# Patient Record
Sex: FEMALE | Race: WHITE | NOT HISPANIC OR LATINO | Employment: FULL TIME | URBAN - METROPOLITAN AREA
[De-identification: names, ages, dates, MRNs, and addresses within clinical notes are randomized per-mention and may not be internally consistent; named-entity substitution may affect disease eponyms.]

---

## 2019-02-14 ENCOUNTER — APPOINTMENT (EMERGENCY)
Dept: RADIOLOGY | Facility: HOSPITAL | Age: 19
End: 2019-02-14

## 2019-02-14 ENCOUNTER — HOSPITAL ENCOUNTER (EMERGENCY)
Facility: HOSPITAL | Age: 19
Discharge: HOME | End: 2019-02-14
Attending: EMERGENCY MEDICINE | Admitting: EMERGENCY MEDICINE

## 2019-02-14 VITALS
HEART RATE: 113 BPM | TEMPERATURE: 98.7 F | RESPIRATION RATE: 20 BRPM | HEIGHT: 67 IN | OXYGEN SATURATION: 99 % | BODY MASS INDEX: 29.55 KG/M2 | WEIGHT: 188.27 LBS | SYSTOLIC BLOOD PRESSURE: 131 MMHG | DIASTOLIC BLOOD PRESSURE: 75 MMHG

## 2019-02-14 DIAGNOSIS — J45.901 ASTHMA EXACERBATION: Primary | ICD-10-CM

## 2019-02-14 DIAGNOSIS — J06.9 VIRAL URI: ICD-10-CM

## 2019-02-14 LAB
ANION GAP SERPL CALCULATED.3IONS-SCNC: 14 MMOL/L (ref 4–13)
B-HCG SERPL-ACNC: <2 MIU/ML
BASOPHILS # BLD AUTO: 0.05 THOUSANDS/ΜL (ref 0–0.1)
BASOPHILS NFR BLD AUTO: 1 % (ref 0–1)
BUN SERPL-MCNC: 3 MG/DL (ref 5–25)
CALCIUM SERPL-MCNC: 9.4 MG/DL (ref 8.3–10.1)
CHLORIDE SERPL-SCNC: 103 MMOL/L (ref 100–108)
CO2 SERPL-SCNC: 27 MMOL/L (ref 21–32)
CREAT SERPL-MCNC: 0.51 MG/DL (ref 0.6–1.3)
EOSINOPHIL # BLD AUTO: 1.93 THOUSAND/ΜL (ref 0–0.61)
EOSINOPHIL NFR BLD AUTO: 20 % (ref 0–6)
ERYTHROCYTE [DISTWIDTH] IN BLOOD BY AUTOMATED COUNT: 13.6 % (ref 11.6–15.1)
GFR SERPL CREATININE-BSD FRML MDRD: 139 ML/MIN/1.73SQ M
GLUCOSE SERPL-MCNC: 92 MG/DL (ref 65–140)
HCT VFR BLD AUTO: 45.4 % (ref 34.8–46.1)
HGB BLD-MCNC: 14.6 G/DL (ref 11.5–15.4)
IMM GRANULOCYTES # BLD AUTO: 0.12 THOUSAND/UL (ref 0–0.2)
IMM GRANULOCYTES NFR BLD AUTO: 1 % (ref 0–2)
LYMPHOCYTES # BLD AUTO: 1.67 THOUSANDS/ΜL (ref 0.6–4.47)
LYMPHOCYTES NFR BLD AUTO: 17 % (ref 14–44)
MAGNESIUM SERPL-MCNC: 2.1 MG/DL (ref 1.6–2.6)
MCH RBC QN AUTO: 27.7 PG (ref 26.8–34.3)
MCHC RBC AUTO-ENTMCNC: 32.2 G/DL (ref 31.4–37.4)
MCV RBC AUTO: 86 FL (ref 82–98)
MONOCYTES # BLD AUTO: 0.42 THOUSAND/ΜL (ref 0.17–1.22)
MONOCYTES NFR BLD AUTO: 4 % (ref 4–12)
NEUTROPHILS # BLD AUTO: 5.68 THOUSANDS/ΜL (ref 1.85–7.62)
NEUTS SEG NFR BLD AUTO: 57 % (ref 43–75)
NRBC BLD AUTO-RTO: 0 /100 WBCS
PLATELET # BLD AUTO: 335 THOUSANDS/UL (ref 149–390)
PMV BLD AUTO: 10.8 FL (ref 8.9–12.7)
POTASSIUM SERPL-SCNC: 3.3 MMOL/L (ref 3.5–5.3)
RBC # BLD AUTO: 5.27 MILLION/UL (ref 3.81–5.12)
SODIUM SERPL-SCNC: 144 MMOL/L (ref 136–145)
WBC # BLD AUTO: 9.87 THOUSAND/UL (ref 4.31–10.16)

## 2019-02-14 PROCEDURE — 93005 ELECTROCARDIOGRAM TRACING: CPT

## 2019-02-14 PROCEDURE — 80048 BASIC METABOLIC PNL TOTAL CA: CPT | Performed by: PHYSICIAN ASSISTANT

## 2019-02-14 PROCEDURE — 85025 COMPLETE CBC W/AUTO DIFF WBC: CPT | Performed by: PHYSICIAN ASSISTANT

## 2019-02-14 PROCEDURE — 96361 HYDRATE IV INFUSION ADD-ON: CPT

## 2019-02-14 PROCEDURE — 83735 ASSAY OF MAGNESIUM: CPT | Performed by: PHYSICIAN ASSISTANT

## 2019-02-14 PROCEDURE — 71045 X-RAY EXAM CHEST 1 VIEW: CPT

## 2019-02-14 PROCEDURE — 96365 THER/PROPH/DIAG IV INF INIT: CPT

## 2019-02-14 PROCEDURE — 99285 EMERGENCY DEPT VISIT HI MDM: CPT

## 2019-02-14 PROCEDURE — 84702 CHORIONIC GONADOTROPIN TEST: CPT | Performed by: PHYSICIAN ASSISTANT

## 2019-02-14 PROCEDURE — 94640 AIRWAY INHALATION TREATMENT: CPT

## 2019-02-14 PROCEDURE — 96366 THER/PROPH/DIAG IV INF ADDON: CPT

## 2019-02-14 PROCEDURE — 96375 TX/PRO/DX INJ NEW DRUG ADDON: CPT

## 2019-02-14 PROCEDURE — 36415 COLL VENOUS BLD VENIPUNCTURE: CPT | Performed by: PHYSICIAN ASSISTANT

## 2019-02-14 RX ORDER — METHYLPREDNISOLONE SODIUM SUCCINATE 125 MG/2ML
125 INJECTION, POWDER, LYOPHILIZED, FOR SOLUTION INTRAMUSCULAR; INTRAVENOUS ONCE
Status: COMPLETED | OUTPATIENT
Start: 2019-02-14 | End: 2019-02-14

## 2019-02-14 RX ORDER — MAGNESIUM SULFATE HEPTAHYDRATE 40 MG/ML
2 INJECTION, SOLUTION INTRAVENOUS ONCE
Status: COMPLETED | OUTPATIENT
Start: 2019-02-14 | End: 2019-02-14

## 2019-02-14 RX ORDER — AZITHROMYCIN 250 MG/1
500 TABLET, FILM COATED ORAL ONCE
Status: COMPLETED | OUTPATIENT
Start: 2019-02-14 | End: 2019-02-14

## 2019-02-14 RX ORDER — AZITHROMYCIN 250 MG/1
TABLET, FILM COATED ORAL
Qty: 4 TABLET | Refills: 0 | Status: SHIPPED | OUTPATIENT
Start: 2019-02-14 | End: 2019-02-18

## 2019-02-14 RX ORDER — ALBUTEROL SULFATE 2.5 MG/3ML
5 SOLUTION RESPIRATORY (INHALATION) ONCE
Status: COMPLETED | OUTPATIENT
Start: 2019-02-14 | End: 2019-02-14

## 2019-02-14 RX ORDER — ALBUTEROL SULFATE 2.5 MG/3ML
2.5 SOLUTION RESPIRATORY (INHALATION) EVERY 6 HOURS PRN
Qty: 75 ML | Refills: 0 | Status: SHIPPED | OUTPATIENT
Start: 2019-02-14

## 2019-02-14 RX ORDER — ALBUTEROL SULFATE 90 UG/1
2 AEROSOL, METERED RESPIRATORY (INHALATION) ONCE
Status: COMPLETED | OUTPATIENT
Start: 2019-02-14 | End: 2019-02-14

## 2019-02-14 RX ORDER — PREDNISONE 1 MG/1
TABLET ORAL
Qty: 21 TABLET | Refills: 0 | Status: SHIPPED | OUTPATIENT
Start: 2019-02-14

## 2019-02-14 RX ORDER — SODIUM CHLORIDE FOR INHALATION 0.9 %
3 VIAL, NEBULIZER (ML) INHALATION ONCE
Status: COMPLETED | OUTPATIENT
Start: 2019-02-14 | End: 2019-02-14

## 2019-02-14 RX ORDER — ALBUTEROL SULFATE 90 UG/1
2 AEROSOL, METERED RESPIRATORY (INHALATION) EVERY 6 HOURS PRN
COMMUNITY

## 2019-02-14 RX ADMIN — ISODIUM CHLORIDE 3 ML: 0.03 SOLUTION RESPIRATORY (INHALATION) at 13:03

## 2019-02-14 RX ADMIN — MAGNESIUM SULFATE HEPTAHYDRATE 2 G: 40 INJECTION, SOLUTION INTRAVENOUS at 13:04

## 2019-02-14 RX ADMIN — IPRATROPIUM BROMIDE 0.5 MG: 0.5 SOLUTION RESPIRATORY (INHALATION) at 15:30

## 2019-02-14 RX ADMIN — ALBUTEROL SULFATE 5 MG: 2.5 SOLUTION RESPIRATORY (INHALATION) at 15:30

## 2019-02-14 RX ADMIN — ALBUTEROL SULFATE 5 MG: 2.5 SOLUTION RESPIRATORY (INHALATION) at 12:31

## 2019-02-14 RX ADMIN — IPRATROPIUM BROMIDE 0.5 MG: 0.5 SOLUTION RESPIRATORY (INHALATION) at 13:03

## 2019-02-14 RX ADMIN — ALBUTEROL SULFATE 2 PUFF: 90 AEROSOL, METERED RESPIRATORY (INHALATION) at 16:55

## 2019-02-14 RX ADMIN — METHYLPREDNISOLONE SODIUM SUCCINATE 125 MG: 125 INJECTION, POWDER, FOR SOLUTION INTRAMUSCULAR; INTRAVENOUS at 13:04

## 2019-02-14 RX ADMIN — IPRATROPIUM BROMIDE 0.5 MG: 0.5 SOLUTION RESPIRATORY (INHALATION) at 12:31

## 2019-02-14 RX ADMIN — AZITHROMYCIN 500 MG: 250 TABLET, FILM COATED ORAL at 16:54

## 2019-02-14 RX ADMIN — ALBUTEROL SULFATE 5 MG: 2.5 SOLUTION RESPIRATORY (INHALATION) at 13:03

## 2019-02-14 RX ADMIN — SODIUM CHLORIDE 1000 ML: 0.9 INJECTION, SOLUTION INTRAVENOUS at 13:05

## 2019-02-14 NOTE — ED PROVIDER NOTES
History  Chief Complaint   Patient presents with    Shortness of Breath     Hx asthma, states started with cough and cold symptoms several days ago and has progressed to shortness of breath  States has been taking mucinex and inhaler, but ran out of meds for the inhaler  Wheezing, speaking full sentences     19 y o  female presents to the ED with chief complaint of asthma exacerbation  Onset of symptoms reported as 4 to 5 days ago  Location of symptoms is reported as the chest  Quality is reported as wheezing  Severity is reported as moderate  Associated symptoms:    Positive for cough  Positive for wheezing  Denies fevers  Denies chest pain  Denies rash  Denies lip/tongue/facial swelling  Denies extremity edema  Modifiers: tried mucinex OTC and inhaler without improvement  Context:  Patient reports asthma symptoms started worsening 4-5 days ago  Patient reports asthma symptoms since childhood  Patient reports no hospitalizations for treatment of asthma within the past year   Patient denies cigarette smoking  Patient denies history of intubation in the past for treatment of asthma  Denies recent sick contacts  Denies recent travel outside the country  Medical summary: review of past visit history via EPIC demonstrates:  No prior visits to this ED        History provided by:  Patient   used: No        Prior to Admission Medications   Prescriptions Last Dose Informant Patient Reported? Taking? albuterol (PROVENTIL HFA,VENTOLIN HFA) 90 mcg/act inhaler   Yes Yes   Sig: Inhale 2 puffs every 6 (six) hours as needed for wheezing      Facility-Administered Medications: None       Past Medical History:   Diagnosis Date    Asthma        History reviewed  No pertinent surgical history  History reviewed  No pertinent family history  I have reviewed and agree with the history as documented      Social History     Tobacco Use    Smoking status: Former Smoker     Types: Cigarettes     Last attempt to quit: 2018     Years since quittin 2    Smokeless tobacco: Never Used   Substance Use Topics    Alcohol use: Never     Frequency: Never    Drug use: Never        Review of Systems   Constitutional: Negative for activity change, appetite change, chills, diaphoresis, fatigue, fever and unexpected weight change  HENT: Positive for congestion, postnasal drip and rhinorrhea  Negative for dental problem, drooling, ear discharge, ear pain, facial swelling, hearing loss, mouth sores, nosebleeds, sinus pressure, sinus pain, sneezing, sore throat, tinnitus, trouble swallowing and voice change  Eyes: Negative for photophobia, pain, discharge, redness, itching and visual disturbance  Respiratory: Positive for cough, chest tightness, shortness of breath and wheezing  Negative for apnea, choking and stridor  Cardiovascular: Negative for chest pain, palpitations and leg swelling  Gastrointestinal: Negative for abdominal distention, abdominal pain, anal bleeding, blood in stool, constipation, diarrhea, nausea, rectal pain and vomiting  Endocrine: Negative for cold intolerance, heat intolerance, polydipsia, polyphagia and polyuria  Genitourinary: Negative for decreased urine volume, difficulty urinating, dysuria, flank pain, hematuria and urgency  Musculoskeletal: Negative for arthralgias, back pain, gait problem, joint swelling, myalgias, neck pain and neck stiffness  Skin: Negative for color change, pallor, rash and wound  Allergic/Immunologic: Negative for environmental allergies, food allergies and immunocompromised state  Neurological: Negative for dizziness, tremors, seizures, syncope, facial asymmetry, speech difficulty, weakness, light-headedness, numbness and headaches  Hematological: Negative for adenopathy  Does not bruise/bleed easily  Psychiatric/Behavioral: Negative for agitation, behavioral problems and confusion  The patient is not nervous/anxious      All other systems reviewed and are negative  Physical Exam  Physical Exam   Constitutional: She is oriented to person, place, and time  She appears well-developed and well-nourished  No distress  She is not intubated  /75 (BP Location: Right arm)   Pulse (!) 113   Temp 98 7 °F (37 1 °C) (Oral)   Resp 20   Ht 5' 7" (1 702 m)   Wt 85 4 kg (188 lb 4 4 oz)   LMP 02/01/2019   SpO2 99%   BMI 29 49 kg/m²    HENT:   Head: Normocephalic and atraumatic  Right Ear: External ear normal    Left Ear: External ear normal    Nose: Nose normal    Mouth/Throat: Oropharynx is clear and moist  No oropharyngeal exudate or posterior oropharyngeal edema  Eyes: Pupils are equal, round, and reactive to light  Conjunctivae and EOM are normal  Right eye exhibits no discharge  Left eye exhibits no discharge  No scleral icterus  Neck: Normal range of motion  Neck supple  No hepatojugular reflux and no JVD present  No tracheal deviation present  No thyromegaly present  Cardiovascular: Regular rhythm and intact distal pulses  Tachycardia present  Pulmonary/Chest: No accessory muscle usage or stridor  Tachypnea noted  No apnea and no bradypnea  She is not intubated  No respiratory distress  She has wheezes in the right upper field, the right middle field, the right lower field, the left upper field, the left middle field and the left lower field  Chest wall is not dull to percussion  She exhibits no mass, no tenderness, no bony tenderness, no laceration, no crepitus, no edema, no deformity, no swelling and no retraction  Abdominal: Soft  Bowel sounds are normal  She exhibits no distension, no ascites and no mass  There is no splenomegaly or hepatomegaly  There is no tenderness  There is no rebound and no guarding  No hernia  Musculoskeletal: Normal range of motion  She exhibits no edema, tenderness or deformity  Right lower leg: Normal  She exhibits no tenderness and no edema          Left lower leg: Normal  She exhibits no tenderness and no edema  Lymphadenopathy:     She has no cervical adenopathy  Neurological: She is alert and oriented to person, place, and time  She is not disoriented  She displays normal reflexes  No cranial nerve deficit or sensory deficit  She exhibits normal muscle tone  Coordination normal    Skin: Skin is warm and dry  Capillary refill takes less than 2 seconds  No ecchymosis and no rash noted  She is not diaphoretic  No cyanosis or erythema  No pallor  Nails show no clubbing  Psychiatric: She has a normal mood and affect  Her behavior is normal  Judgment and thought content normal    Nursing note and vitals reviewed        Vital Signs  ED Triage Vitals   Temperature Pulse Respirations Blood Pressure SpO2   02/14/19 1210 02/14/19 1210 02/14/19 1210 02/14/19 1210 02/14/19 1210   98 7 °F (37 1 °C) (!) 110 22 136/86 (!) 89 %      Temp Source Heart Rate Source Patient Position - Orthostatic VS BP Location FiO2 (%)   02/14/19 1210 02/14/19 1440 02/14/19 1440 02/14/19 1210 --   Oral Monitor Sitting Right arm       Pain Score       02/14/19 1210       No Pain           Vitals:    02/14/19 1210 02/14/19 1440   BP: 136/86 131/75   Pulse: (!) 110 (!) 113   Patient Position - Orthostatic VS:  Sitting       Visual Acuity      ED Medications  Medications   albuterol inhalation solution 5 mg (5 mg Nebulization Given 2/14/19 1231)   ipratropium (ATROVENT) 0 02 % inhalation solution 0 5 mg (0 5 mg Nebulization Given 2/14/19 1231)   sodium chloride 0 9 % bolus 1,000 mL (0 mL Intravenous Stopped 2/14/19 1547)   albuterol inhalation solution 10 mg (5 mg Nebulization Given 2/14/19 1303)     And   ipratropium (ATROVENT) 0 02 % inhalation solution 1 mg (0 5 mg Nebulization Given 2/14/19 1303)     And   sodium chloride 0 9 % inhalation solution 3 mL (3 mL Nebulization Given 2/14/19 1303)   magnesium sulfate 2 g/50 mL IVPB (premix) 2 g (0 g Intravenous Stopped 2/14/19 1442)   methylPREDNISolone sodium succinate (Solu-MEDROL) injection 125 mg (125 mg Intravenous Given 2/14/19 1304)   albuterol inhalation solution 5 mg (5 mg Nebulization Given 2/14/19 1530)   ipratropium (ATROVENT) 0 02 % inhalation solution 0 5 mg (0 5 mg Nebulization Given 2/14/19 1530)   azithromycin (ZITHROMAX) tablet 500 mg (500 mg Oral Given 2/14/19 1654)   albuterol (PROVENTIL HFA,VENTOLIN HFA) inhaler 2 puff (2 puffs Inhalation Given 2/14/19 1655)       Diagnostic Studies  Results Reviewed     Procedure Component Value Units Date/Time    Basic metabolic panel [[id number]]  (Abnormal) Collected:  02/14/19 1258    Lab Status:  Final result Specimen:  Blood from Arm, Left Updated:  02/14/19 1330     Sodium 144 mmol/L      Potassium 3 3 mmol/L      Chloride 103 mmol/L      CO2 27 mmol/L      ANION GAP 14 mmol/L      BUN 3 mg/dL      Creatinine 0 51 mg/dL      Glucose 92 mg/dL      Calcium 9 4 mg/dL      eGFR 139 ml/min/1 73sq m     Narrative:       National Kidney Disease Education Program recommendations are as follows:  GFR calculation is accurate only with a steady state creatinine  Chronic Kidney disease less than 60 ml/min/1 73 sq  meters  Kidney failure less than 15 ml/min/1 73 sq  meters      Magnesium [[id number]]  (Normal) Collected:  02/14/19 1258    Lab Status:  Final result Specimen:  Blood from Arm, Left Updated:  02/14/19 1330     Magnesium 2 1 mg/dL     Quantitative hCG [[id number]]  (Normal) Collected:  02/14/19 1258    Lab Status:  Final result Specimen:  Blood from Arm, Left Updated:  02/14/19 1330     HCG, Quant <2 mIU/mL     Narrative:        Expected Ranges:   Approximate               Approximate HCG  Gestation age          Concentration ( mIU/mL)  _____________          ______________________   Marvin Ring                      HCG values  0 2-1                       5-50  1-2                           2-3                         100-5000  3-4                         500-15158  4-5                         1000-07022  5-6 [id number]  6-8                         [id number]  8-12                        [id number]    CBC and differential [[id number]]  (Abnormal) Collected:  02/14/19 1258    Lab Status:  Final result Specimen:  Blood from Arm, Left Updated:  02/14/19 1306     WBC 9 87 Thousand/uL      RBC 5 27 Million/uL      Hemoglobin 14 6 g/dL      Hematocrit 45 4 %      MCV 86 fL      MCH 27 7 pg      MCHC 32 2 g/dL      RDW 13 6 %      MPV 10 8 fL      Platelets 865 Thousands/uL      nRBC 0 /100 WBCs      Neutrophils Relative 57 %      Immat GRANS % 1 %      Lymphocytes Relative 17 %      Monocytes Relative 4 %      Eosinophils Relative 20 %      Basophils Relative 1 %      Neutrophils Absolute 5 68 Thousands/µL      Immature Grans Absolute 0 12 Thousand/uL      Lymphocytes Absolute 1 67 Thousands/µL      Monocytes Absolute 0 42 Thousand/µL      Eosinophils Absolute 1 93 Thousand/µL      Basophils Absolute 0 05 Thousands/µL                  XR chest 1 view portable   Final Result by Cisco Chao MD (02/14 1427)      No acute cardiopulmonary disease              Workstation performed: [id number]                    Procedures  ECG 12 Lead Documentation  Date/Time: 2/14/2019 12:22 PM  Performed by: Armando Bernabe PA-C  Authorized by: Armando Bernabe PA-C     Indications / Diagnosis:  Sob  ECG reviewed by me, the ED Provider: yes    Patient location:  ED  Previous ECG:     Previous ECG:  Unavailable    Comparison to cardiac monitor: Yes    Interpretation:     Interpretation: normal    Rate:     ECG rate:  106    ECG rate assessment: tachycardic    Rhythm:     Rhythm: sinus tachycardia    Ectopy:     Ectopy: none    QRS:     QRS axis:  Normal    QRS intervals:  Normal  Conduction:     Conduction: normal    ST segments:     ST segments:  Normal  T waves:     T waves: normal             Phone Contacts  ED Phone Contact    ED Course                               MDM  Number of Diagnoses or Management Options  Asthma exacerbation: new and requires workup  Viral URI: new and requires workup  Diagnosis management comments: ddx includes but is not limited to asthma exacerbation, upper or lower airway obstruction, anaphylaxis, allergic reaction, CHF, inhalation injury, pneumonia, PE, pneumothorax, pleural effusion, viral syndrome, RSV, bronchitis,  pulmonary fibrosis, metabolic sources of tachypnia, anxiety,   consider but doubt ACS, [place], pericarditis    chest xray images independently visualized and interpreted by me  No acute pneumothorax or infiltrate  Lab results reviewed:  CBC remarkable for normal WBC at 9 8   hgb 14 6 and hct 45 4  Normal   BMP reviewed: potassium slightly low at 3 3  BUN low at 3, creatinine low at 0 51  Anion gap mildly elevated at 14  Magnesium 2 1 normal      ED coarse: patient given solumedrol, GRACIA neb followed by short albuterol/atrovent nebulizer, magnesium IV and symptoms improved, wheezing improved but not fully resolved  Peak flow performed in ED - poor  Discussed with patient after multiple treatment in ED recommended admission for further treatment of asthma exacerbation  Patient reports she feels significantly improved and does not feel that she needs admission  I discussed with her after multiple treatments her oxygen saturation is still 90% on room air which is low and her peak flow is poor which would suggest she is not going to well with discharge home and will be at significant risk for worsening of asthma symptoms  Patient verbalized understanding of same but declines admission  She would like to attempt outpatient treatment  She has nebulizer at home  Would like to use nebulizer, prednisone, and will cover with antibiotics  Extensive discussion with patient that she should return to ED for any worsening or worrisome symptoms  She verbalized understanding of same  She understands the risk of worsening symptoms of asthma may lead to hypoxia/respiratory failure/respiratory arrest and death  Reviewed reasons to return to ed  Patient verbalized understanding of diagnosis and agreement with discharge plan of care as well as understanding of reasons to return to ed  Amount and/or Complexity of Data Reviewed  Clinical lab tests: ordered and reviewed  Tests in the radiology section of CPT®: ordered and reviewed  Discussion of test results with the performing providers: yes  Obtain history from someone other than the patient: yes  Review and summarize past medical records: yes  Independent visualization of images, tracings, or specimens: yes    Patient Progress  Patient progress: improved      Disposition  Final diagnoses:   Asthma exacerbation   Viral URI     Time reflects when diagnosis was documented in both MDM as applicable and the Disposition within this note     Time User Action Codes Description Comment    2/14/2019  4:35 PM Oumou Sacks Add [T04 418] Asthma exacerbation     2/14/2019  4:35 PM Oumou Sacks Add [J06 9] Viral URI       ED Disposition     ED Disposition Condition Date/Time Comment    Discharge Fair u Feb 14, 2019  4:35 PM Nathalie Carmona discharge to home/self care  Follow-up Information     Follow up With Specialties Details Why Contact Info Additional [address] [facility] Emergency Department Emergency Medicine Call in 1 day If symptoms worsen [address] [facility] [address], [address], [place], [address] [facility],[address], PA-C Pulmonary Disease, Family Medicine, Pulmonology, Physician Assistant Call in 1 day for further evaluation of symptoms [address] [address]  [phone]       Rolanda Nieto MD Family Medicine Call in 1 day for further evaluation of symptoms [address] [facility]  [phone]             Discharge Medication List as of 2/14/2019  4:45 PM      START taking these medications    Details   albuterol (2 5 mg/3 mL) 0 083 % nebulizer solution Take 1 vial (2 5 mg total) by nebulization every 6 (six) hours as needed for wheezing or shortness of breath, Starting Thu 2/14/2019, Print      azithromycin (ZITHROMAX) 250 mg tablet 1 tablet PO daily x 4 days  Start tomorrow, Print      predniSONE 5 mg tablet 40 mg PO day 1, then 30 mg PO day 2, 20 mg PO day 3, 10 mg PO day 4, 5 mg PO day 5 then stop, Print         CONTINUE these medications which have NOT CHANGED    Details   albuterol (PROVENTIL HFA,VENTOLIN HFA) 90 mcg/act inhaler Inhale 2 puffs every 6 (six) hours as needed for wheezing, Historical Med           No discharge procedures on file      ED Provider  Electronically Signed by           Severiaon Agarwal PA-C  02/16/19 7737

## 2019-02-14 NOTE — ED NOTES
Discharge instructions and medications reviewed  Pt instructed that if her breathing gets worse she needs to come back to the emergency dept  Pt with no questions or concerns at this time  Pt does understand instructions  Pt ambulatory off unit with steady gait        Maria Antonia Herndon RN  02/14/19 2928

## 2019-02-14 NOTE — ED NOTES
NC O2 applied at [address] [facility], [address] [place] [address]  02/14/19 [address] [place] [address]

## 2019-02-16 LAB
ATRIAL RATE: 106 BPM
P AXIS: 84 DEGREES
PR INTERVAL: 126 MS
QRS AXIS: 62 DEGREES
QRSD INTERVAL: 80 MS
QT INTERVAL: 310 MS
QTC INTERVAL: 411 MS
T WAVE AXIS: 55 DEGREES
VENTRICULAR RATE: 106 BPM

## 2019-02-16 PROCEDURE — 93010 ELECTROCARDIOGRAM REPORT: CPT | Performed by: INTERNAL MEDICINE
